# Patient Record
Sex: FEMALE | URBAN - METROPOLITAN AREA
[De-identification: names, ages, dates, MRNs, and addresses within clinical notes are randomized per-mention and may not be internally consistent; named-entity substitution may affect disease eponyms.]

---

## 2020-08-25 ENCOUNTER — APPOINTMENT (RX ONLY)
Dept: URBAN - METROPOLITAN AREA CLINIC 42 | Facility: CLINIC | Age: 60
Setting detail: DERMATOLOGY
End: 2020-08-25

## 2020-08-25 VITALS — TEMPERATURE: 98.7 F

## 2020-08-25 DIAGNOSIS — L65.0 TELOGEN EFFLUVIUM: ICD-10-CM

## 2020-08-25 DIAGNOSIS — D18.0 HEMANGIOMA: ICD-10-CM

## 2020-08-25 PROBLEM — D18.01 HEMANGIOMA OF SKIN AND SUBCUTANEOUS TISSUE: Status: ACTIVE | Noted: 2020-08-25

## 2020-08-25 PROBLEM — L65.9 NONSCARRING HAIR LOSS, UNSPECIFIED: Status: ACTIVE | Noted: 2020-08-25

## 2020-08-25 PROCEDURE — 99202 OFFICE O/P NEW SF 15 MIN: CPT

## 2020-08-25 PROCEDURE — ? COUNSELING

## 2020-08-25 PROCEDURE — ? DIAGNOSIS COMMENT

## 2020-08-25 ASSESSMENT — LOCATION ZONE DERM: LOCATION ZONE: SCALP

## 2020-08-25 ASSESSMENT — LOCATION SIMPLE DESCRIPTION DERM: LOCATION SIMPLE: ANTERIOR SCALP

## 2020-08-25 ASSESSMENT — LOCATION DETAILED DESCRIPTION DERM: LOCATION DETAILED: MID-FRONTAL SCALP

## 2020-08-25 NOTE — PROCEDURE: DIAGNOSIS COMMENT
Detail Level: Zone
Comment: Diffuse hair loss - telogen effluvium vs. alopecia areata\\n\\nHair pull test - 3\\nBW ordered - CBC, Ferritin, TIBC, and TSH
Comment: Reassured benign

## 2020-08-25 NOTE — HPI: OTHER
Condition:: Hair loss
Please Describe Your Condition:: Diffuse hair loss starting x 4 weeks \\nthinning on the scalp \\nReports use of box dye, aloe vera, Rogaine x 2 weeks\\nDenies pain, flaking, or itching